# Patient Record
Sex: FEMALE | Race: WHITE | NOT HISPANIC OR LATINO | Employment: FULL TIME | URBAN - METROPOLITAN AREA
[De-identification: names, ages, dates, MRNs, and addresses within clinical notes are randomized per-mention and may not be internally consistent; named-entity substitution may affect disease eponyms.]

---

## 2023-09-11 ENCOUNTER — HOSPITAL ENCOUNTER (OUTPATIENT)
Dept: DATA CONVERSION | Facility: HOSPITAL | Age: 48
Discharge: HOME | End: 2023-09-11
Payer: COMMERCIAL

## 2023-09-11 DIAGNOSIS — R87.810 CERVICAL HIGH RISK HUMAN PAPILLOMAVIRUS (HPV) DNA TEST POSITIVE: ICD-10-CM

## 2023-09-21 ENCOUNTER — HOSPITAL ENCOUNTER (OUTPATIENT)
Dept: DATA CONVERSION | Facility: HOSPITAL | Age: 48
Discharge: HOME | End: 2023-09-21
Payer: COMMERCIAL

## 2023-09-21 DIAGNOSIS — Z12.31 ENCOUNTER FOR SCREENING MAMMOGRAM FOR MALIGNANT NEOPLASM OF BREAST: ICD-10-CM

## 2023-12-13 ENCOUNTER — TELEPHONE (OUTPATIENT)
Dept: OTOLARYNGOLOGY | Facility: CLINIC | Age: 48
End: 2023-12-13
Payer: COMMERCIAL

## 2023-12-13 DIAGNOSIS — B99.9 INFECTION: Primary | ICD-10-CM

## 2023-12-13 RX ORDER — AMOXICILLIN AND CLAVULANATE POTASSIUM 875; 125 MG/1; MG/1
1 TABLET, FILM COATED ORAL 2 TIMES DAILY
Qty: 20 TABLET | Refills: 0 | Status: SHIPPED | OUTPATIENT
Start: 2023-12-13 | End: 2023-12-23

## 2023-12-13 NOTE — TELEPHONE ENCOUNTER
For 1 week pt has had a cough, pnd, color drainage, facial and teeth pain.  Has been using allergy medications. Negative covid test.   NKDA

## 2024-01-08 ENCOUNTER — TELEPHONE (OUTPATIENT)
Dept: OBSTETRICS AND GYNECOLOGY | Facility: CLINIC | Age: 49
End: 2024-01-08
Payer: COMMERCIAL

## 2024-01-08 DIAGNOSIS — N88.2 CERVICAL STENOSIS (UTERINE CERVIX): Primary | ICD-10-CM

## 2024-01-08 NOTE — TELEPHONE ENCOUNTER
CS pt due for repeat pap in March. Appt made. CS wanted pt to complete Cytotec prior to visit. Please send now so pt has on hand for visit.

## 2024-01-10 RX ORDER — MISOPROSTOL 200 UG/1
200 TABLET ORAL ONCE
Qty: 1 TABLET | Refills: 0 | Status: SHIPPED | OUTPATIENT
Start: 2024-01-10 | End: 2024-01-10

## 2024-02-02 DIAGNOSIS — J01.90 ACUTE SINUSITIS, RECURRENCE NOT SPECIFIED, UNSPECIFIED LOCATION: ICD-10-CM

## 2024-02-02 RX ORDER — ALBUTEROL SULFATE 90 UG/1
AEROSOL, METERED RESPIRATORY (INHALATION)
Qty: 18 G | Refills: 2 | Status: SHIPPED | OUTPATIENT
Start: 2024-02-02 | End: 2024-04-27 | Stop reason: WASHOUT

## 2024-02-23 ENCOUNTER — APPOINTMENT (OUTPATIENT)
Dept: OTOLARYNGOLOGY | Facility: CLINIC | Age: 49
End: 2024-02-23
Payer: COMMERCIAL

## 2024-03-21 ENCOUNTER — OFFICE VISIT (OUTPATIENT)
Dept: OBSTETRICS AND GYNECOLOGY | Facility: CLINIC | Age: 49
End: 2024-03-21
Payer: COMMERCIAL

## 2024-03-21 VITALS — WEIGHT: 203.8 LBS | DIASTOLIC BLOOD PRESSURE: 92 MMHG | SYSTOLIC BLOOD PRESSURE: 130 MMHG | BODY MASS INDEX: 34.71 KG/M2

## 2024-03-21 DIAGNOSIS — R87.810 CERVICAL HIGH RISK HPV (HUMAN PAPILLOMAVIRUS) TEST POSITIVE: Primary | ICD-10-CM

## 2024-03-21 PROCEDURE — 87624 HPV HI-RISK TYP POOLED RSLT: CPT | Performed by: OBSTETRICS & GYNECOLOGY

## 2024-03-21 PROCEDURE — 1036F TOBACCO NON-USER: CPT | Performed by: OBSTETRICS & GYNECOLOGY

## 2024-03-21 PROCEDURE — 88175 CYTOPATH C/V AUTO FLUID REDO: CPT | Mod: TC,GCY | Performed by: OBSTETRICS & GYNECOLOGY

## 2024-03-21 PROCEDURE — 99213 OFFICE O/P EST LOW 20 MIN: CPT | Performed by: OBSTETRICS & GYNECOLOGY

## 2024-03-21 RX ORDER — LEVONORGESTREL 52 MG/1
1 INTRAUTERINE DEVICE INTRAUTERINE ONCE
COMMUNITY

## 2024-03-21 RX ORDER — VALSARTAN AND HYDROCHLOROTHIAZIDE 80; 12.5 MG/1; MG/1
1 TABLET, FILM COATED ORAL DAILY
COMMUNITY
End: 2024-05-07

## 2024-03-21 ASSESSMENT — ENCOUNTER SYMPTOMS
DYSURIA: 0
COLOR CHANGE: 0
HEADACHES: 0
FEVER: 0
NAUSEA: 0
COUGH: 0
VOMITING: 0
ABDOMINAL PAIN: 0
CHILLS: 0
UNEXPECTED WEIGHT CHANGE: 0
OCCASIONAL FEELINGS OF UNSTEADINESS: 0
SHORTNESS OF BREATH: 0
FATIGUE: 0
DEPRESSION: 0
LOSS OF SENSATION IN FEET: 0
DIZZINESS: 0

## 2024-03-21 ASSESSMENT — PATIENT HEALTH QUESTIONNAIRE - PHQ9
1. LITTLE INTEREST OR PLEASURE IN DOING THINGS: NOT AT ALL
SUM OF ALL RESPONSES TO PHQ9 QUESTIONS 1 & 2: 0
2. FEELING DOWN, DEPRESSED OR HOPELESS: NOT AT ALL

## 2024-03-21 ASSESSMENT — LIFESTYLE VARIABLES
HOW OFTEN DO YOU HAVE SIX OR MORE DRINKS ON ONE OCCASION: LESS THAN MONTHLY
SKIP TO QUESTIONS 9-10: 0
HOW OFTEN DO YOU HAVE A DRINK CONTAINING ALCOHOL: MONTHLY OR LESS
AUDIT-C TOTAL SCORE: 2
HOW MANY STANDARD DRINKS CONTAINING ALCOHOL DO YOU HAVE ON A TYPICAL DAY: 1 OR 2

## 2024-03-21 ASSESSMENT — PAIN SCALES - GENERAL: PAINLEVEL: 0-NO PAIN

## 2024-03-21 NOTE — PROGRESS NOTES
Subjective   Patient ID: Sruthi Murray is a 49 y.o. female who presents for Repeat PAP.  49-year-old female presents for follow-up Pap smear.  Patient getting Paps every 6 months after cone biopsy done in 2022, showing MISSAEL 1.  Patient's Pap have ranged between ASCUS to negative but still HPV 16 and other positive over the last couple of times.  Patient states she does not want to get any colposcopy done anymore.  Patient is in her words over all of this stuff.  Patient wanting to discuss further options and just be done with it i.e. get hysterectomy if she still has issues.        Review of Systems   Constitutional:  Negative for chills, fatigue, fever and unexpected weight change.   Respiratory:  Negative for cough and shortness of breath.    Gastrointestinal:  Negative for abdominal pain, nausea and vomiting.   Genitourinary:  Negative for dyspareunia, dysuria, pelvic pain and vaginal discharge.   Skin:  Negative for color change and rash.   Neurological:  Negative for dizziness and headaches.       Objective   Physical Exam  Constitutional:       Appearance: Normal appearance.   HENT:      Head: Normocephalic and atraumatic.   Genitourinary:     General: Normal vulva.      Vagina: Normal.      Cervix: Normal.   Skin:     General: Skin is warm and dry.   Neurological:      General: No focal deficit present.      Mental Status: She is alert.   Psychiatric:         Attention and Perception: Attention and perception normal.         Mood and Affect: Mood and affect normal.         Speech: Speech normal.         Behavior: Behavior is cooperative.         Assessment/Plan   Problem List Items Addressed This Visit    None  Visit Diagnoses         Codes    Cervical high risk HPV (human papillomavirus) test positive    -  Primary R87.810    Relevant Orders    THINPREP PAP TEST        Will make televisit to discuss options once results revealed.         Ming Tamayo MD 03/21/24 5:08 PM

## 2024-04-01 LAB
CYTOLOGY CMNT CVX/VAG CYTO-IMP: NORMAL
HPV HR 12 DNA GENITAL QL NAA+PROBE: POSITIVE
HPV HR GENOTYPES PNL CVX NAA+PROBE: POSITIVE
HPV16 DNA SPEC QL NAA+PROBE: POSITIVE
HPV18 DNA SPEC QL NAA+PROBE: NEGATIVE
LAB AP HPV GENOTYPE QUESTION: YES
LAB AP HPV HR: NORMAL
LABORATORY COMMENT REPORT: NORMAL
LMP START DATE: NORMAL
PATH REPORT.TOTAL CANCER: NORMAL

## 2024-04-12 ENCOUNTER — OFFICE VISIT (OUTPATIENT)
Dept: OTOLARYNGOLOGY | Facility: CLINIC | Age: 49
End: 2024-04-12
Payer: COMMERCIAL

## 2024-04-12 VITALS — WEIGHT: 200 LBS | HEIGHT: 65 IN | BODY MASS INDEX: 33.32 KG/M2

## 2024-04-12 DIAGNOSIS — J01.91 ACUTE RECURRENT SINUSITIS, UNSPECIFIED LOCATION: Primary | ICD-10-CM

## 2024-04-12 DIAGNOSIS — J34.2 DEVIATED NASAL SEPTUM: ICD-10-CM

## 2024-04-12 DIAGNOSIS — J30.9 ALLERGIC RHINITIS, UNSPECIFIED SEASONALITY, UNSPECIFIED TRIGGER: ICD-10-CM

## 2024-04-12 PROCEDURE — 99213 OFFICE O/P EST LOW 20 MIN: CPT | Performed by: OTOLARYNGOLOGY

## 2024-04-12 PROCEDURE — 1036F TOBACCO NON-USER: CPT | Performed by: OTOLARYNGOLOGY

## 2024-04-12 NOTE — PROGRESS NOTES
"Chief Complaint   Patient presents with    Follow-up     EP 2/23/23- ANNUAL SINUS CK      Date of Evaluation: 4/12/2024   HPI  She is having 2-3 sinus infections a year requiring antibiotics.  She usually calls me for Augmentin.  Other than that she is doing very well and breathes okay through the nose.  Sruthi Murray is a 49 y.o. female with allergic rhinitis and recurrent sinusitis.  She has done immunotherapy in the past.  She does have a wood-burning stove at home.  She is on Flonase.       Past Medical History:   Diagnosis Date    Abnormal mammogram     Allergies     HTN (hypertension)     Personal history of other diseases of the circulatory system     History of hypertension    Personal history of other diseases of the respiratory system     History of asthma      Past Surgical History:   Procedure Laterality Date    BIOPSY  03/04/2022    TUBAL LIGATION  2003          Medications:   Current Outpatient Medications   Medication Instructions    albuterol 90 mcg/actuation inhaler INHALE 2 PUFF(S) AS DIRECTED 15 MINUTES BEFORE EXERCISE    levonorgestrel (Mirena) 21 mcg/24 hours (8 yrs) 52 mg IUD 1 each, intrauterine, Once    miSOPROStoL (CYTOTEC) 200 mcg, vaginal, Once, Please complete 24hr prior to procedure/pap smear    valsartan-hydrochlorothiazide (Diovan-HCT) 80-12.5 mg tablet 1 tablet, oral, Daily        Allergies:  No Known Allergies     Physical Exam:  Last Recorded Vitals  Height 1.651 m (5' 5\"), weight 90.7 kg (200 lb).  []General appearance: Well-developed, well-nourished in no acute distress, conversant with normal voice quality    Head/face: No erythema or edema or facial tenderness, and normal facial nerve function bilaterally    External ear: Clear external auditory canals with normal pinnae  Tube status: N/A  Middle ear: Tympanic membranes intact and mobile, middle ears normal.  Tympanic membrane perforation: N/A  Mastoid bowl: N/A  Hearing: Normal conversational awareness at normal speech " thresholds    Nose visualized using: Anterior rhinoscopy  Nasal dorsum: Nontraumatic midline appearance  Septum: Deviated  Inferior turbinates: Normal, pink  Secretions: Dry    Oral cavity and oropharynx: Normal  Teeth: Good condition  Floor of mouth: without lesions  Palate: Normal hard palate, soft palate and uvula  Oropharynx: Clear, no lesions present  Buccal mucosa: Normal without masses or lesions  Lips: Normal    Nasopharynx: Inadequate mirror exam secondary to gag/anatomy    Neck:  Salivary glands: Normal bilateral parotid and submandibular glands by inspection and palpation.  Non-thyroid masses: No palpable masses or significant lymphadenopathy  Trachea: Midline  Thyroid: No thyromegaly or palpable nodules  Temporomandibular joint: Nontender  Cervical range of motion: Normal    Neurologic exam: Alert and oriented x3, appropriate affect.  Cranial nerves II-XII normal bilaterally  Extraocular movement: Extraocular movement intact, normal gaze alignment        Sruthi was seen today for follow-up.  Diagnoses and all orders for this visit:  Acute recurrent sinusitis, unspecified location (Primary)  Allergic rhinitis, unspecified seasonality, unspecified trigger  Deviated nasal septum       PLAN  Continue Flonase.  Recheck in 1 year.  Call me for Augmentin when necessary.  She is not bothered by the deviated nasal septum and no intervention at this time    Ricardo Hyatt MD

## 2024-04-16 DIAGNOSIS — J30.9 ALLERGIC RHINITIS, UNSPECIFIED: ICD-10-CM

## 2024-04-22 ENCOUNTER — TELEMEDICINE (OUTPATIENT)
Dept: OBSTETRICS AND GYNECOLOGY | Facility: CLINIC | Age: 49
End: 2024-04-22
Payer: COMMERCIAL

## 2024-04-22 DIAGNOSIS — N91.2 ABSENCE OF MENSES DUE TO USE OF CONTRACEPTIVE: ICD-10-CM

## 2024-04-22 DIAGNOSIS — R63.5 WEIGHT GAIN: ICD-10-CM

## 2024-04-22 DIAGNOSIS — R23.2 HOT FLASHES: ICD-10-CM

## 2024-04-22 DIAGNOSIS — R87.810 CERVICAL HIGH RISK HPV (HUMAN PAPILLOMAVIRUS) TEST POSITIVE: Primary | ICD-10-CM

## 2024-04-22 PROCEDURE — 99213 OFFICE O/P EST LOW 20 MIN: CPT | Performed by: OBSTETRICS & GYNECOLOGY

## 2024-04-22 PROCEDURE — 1036F TOBACCO NON-USER: CPT | Performed by: OBSTETRICS & GYNECOLOGY

## 2024-04-22 RX ORDER — FLUTICASONE PROPIONATE 50 MCG
SPRAY, SUSPENSION (ML) NASAL
Qty: 48 ML | Refills: 3 | Status: SHIPPED | OUTPATIENT
Start: 2024-04-22

## 2024-04-26 ENCOUNTER — LAB (OUTPATIENT)
Dept: LAB | Facility: LAB | Age: 49
End: 2024-04-26
Payer: COMMERCIAL

## 2024-04-26 DIAGNOSIS — R63.5 WEIGHT GAIN: ICD-10-CM

## 2024-04-26 DIAGNOSIS — N91.2 ABSENCE OF MENSES DUE TO USE OF CONTRACEPTIVE: ICD-10-CM

## 2024-04-26 DIAGNOSIS — R23.2 HOT FLASHES: ICD-10-CM

## 2024-04-26 LAB
ERYTHROCYTE [DISTWIDTH] IN BLOOD BY AUTOMATED COUNT: 13.2 % (ref 11.5–14.5)
ESTRADIOL SERPL-MCNC: 24 PG/ML
FSH SERPL-ACNC: 140.4 IU/L
HCT VFR BLD AUTO: 44.4 % (ref 36–46)
HGB BLD-MCNC: 14.2 G/DL (ref 12–16)
LH SERPL-ACNC: 60.1 IU/L
MCH RBC QN AUTO: 29.1 PG (ref 26–34)
MCHC RBC AUTO-ENTMCNC: 32 G/DL (ref 32–36)
MCV RBC AUTO: 91 FL (ref 80–100)
NRBC BLD-RTO: 0 /100 WBCS (ref 0–0)
PLATELET # BLD AUTO: 276 X10*3/UL (ref 150–450)
PROGEST SERPL-MCNC: 0.4 NG/ML
RBC # BLD AUTO: 4.88 X10*6/UL (ref 4–5.2)
TSH SERPL-ACNC: 2.14 MIU/L (ref 0.44–3.98)
WBC # BLD AUTO: 4.2 X10*3/UL (ref 4.4–11.3)

## 2024-04-26 PROCEDURE — 36415 COLL VENOUS BLD VENIPUNCTURE: CPT

## 2024-04-26 PROCEDURE — 85027 COMPLETE CBC AUTOMATED: CPT

## 2024-04-26 PROCEDURE — 84443 ASSAY THYROID STIM HORMONE: CPT

## 2024-04-26 PROCEDURE — 82670 ASSAY OF TOTAL ESTRADIOL: CPT

## 2024-04-26 PROCEDURE — 83001 ASSAY OF GONADOTROPIN (FSH): CPT

## 2024-04-26 PROCEDURE — 83002 ASSAY OF GONADOTROPIN (LH): CPT

## 2024-04-26 PROCEDURE — 84144 ASSAY OF PROGESTERONE: CPT

## 2024-04-27 ASSESSMENT — ENCOUNTER SYMPTOMS: ABDOMINAL PAIN: 0

## 2024-04-28 NOTE — PROGRESS NOTES
Subjective   Patient ID: Sruthi Murray is a 49 y.o. female who presents for No chief complaint on file..  49-year-old female presents to discuss Pap smear.  Patient with history of cone and longstanding history of abnormal Paps.  Patient had previously mentioned she was done with getting evaluated and was considering getting everything out.  Patient ultimately did change her mind and is willing to move forward with continued conservative management.  Patient also complaining of some intermittent hot flashes and weight gain.        Review of Systems   Gastrointestinal:  Negative for abdominal pain.   Genitourinary:  Negative for dyspareunia, menstrual problem and vaginal bleeding.       Objective   Physical Exam na    Assessment/Plan   Problem List Items Addressed This Visit    None  Visit Diagnoses         Codes    Cervical high risk HPV (human papillomavirus) test positive    -  Primary R87.810    Hot flashes     R23.2    Relevant Orders    Follicle Stimulating Hormone (Completed)    Luteinizing Hormone (Completed)    Estradiol (Completed)    Progesterone (Completed)    CBC (Completed)    TSH with reflex to Free T4 if abnormal (Completed)    Absence of menses due to use of contraceptive     N91.2    Relevant Orders    Follicle Stimulating Hormone (Completed)    Luteinizing Hormone (Completed)    Estradiol (Completed)    Progesterone (Completed)    CBC (Completed)    Weight gain     R63.5    Relevant Orders    TSH with reflex to Free T4 if abnormal (Completed)        For colposcopy         Ming Tamayo MD 04/27/24 11:19 PM

## 2024-05-05 DIAGNOSIS — I10 PRIMARY HYPERTENSION: ICD-10-CM

## 2024-05-06 ENCOUNTER — PROCEDURE VISIT (OUTPATIENT)
Dept: OBSTETRICS AND GYNECOLOGY | Facility: CLINIC | Age: 49
End: 2024-05-06
Payer: COMMERCIAL

## 2024-05-06 VITALS
SYSTOLIC BLOOD PRESSURE: 132 MMHG | WEIGHT: 200 LBS | BODY MASS INDEX: 31.39 KG/M2 | HEIGHT: 67 IN | DIASTOLIC BLOOD PRESSURE: 96 MMHG

## 2024-05-06 DIAGNOSIS — R87.810 CERVICAL HIGH RISK HPV (HUMAN PAPILLOMAVIRUS) TEST POSITIVE: Primary | ICD-10-CM

## 2024-05-06 PROBLEM — I10 PRIMARY HYPERTENSION: Status: ACTIVE | Noted: 2018-07-16

## 2024-05-06 PROCEDURE — 57456 ENDOCERV CURETTAGE W/SCOPE: CPT | Performed by: OBSTETRICS & GYNECOLOGY

## 2024-05-06 PROCEDURE — 88305 TISSUE EXAM BY PATHOLOGIST: CPT | Mod: TC,WESLAB | Performed by: OBSTETRICS & GYNECOLOGY

## 2024-05-06 PROCEDURE — 88305 TISSUE EXAM BY PATHOLOGIST: CPT | Performed by: PATHOLOGY

## 2024-05-06 PROCEDURE — 88342 IMHCHEM/IMCYTCHM 1ST ANTB: CPT | Performed by: PATHOLOGY

## 2024-05-06 ASSESSMENT — ENCOUNTER SYMPTOMS
OCCASIONAL FEELINGS OF UNSTEADINESS: 0
LOSS OF SENSATION IN FEET: 0
DEPRESSION: 0

## 2024-05-06 ASSESSMENT — PATIENT HEALTH QUESTIONNAIRE - PHQ9
2. FEELING DOWN, DEPRESSED OR HOPELESS: NOT AT ALL
1. LITTLE INTEREST OR PLEASURE IN DOING THINGS: NOT AT ALL
SUM OF ALL RESPONSES TO PHQ9 QUESTIONS 1 & 2: 0

## 2024-05-06 ASSESSMENT — LIFESTYLE VARIABLES
SKIP TO QUESTIONS 9-10: 1
AUDIT-C TOTAL SCORE: 1
HOW MANY STANDARD DRINKS CONTAINING ALCOHOL DO YOU HAVE ON A TYPICAL DAY: 1 OR 2
HOW OFTEN DO YOU HAVE SIX OR MORE DRINKS ON ONE OCCASION: NEVER
HOW OFTEN DO YOU HAVE A DRINK CONTAINING ALCOHOL: MONTHLY OR LESS

## 2024-05-06 ASSESSMENT — PAIN SCALES - GENERAL: PAINLEVEL: 0-NO PAIN

## 2024-05-07 ENCOUNTER — TELEPHONE (OUTPATIENT)
Dept: PRIMARY CARE | Facility: CLINIC | Age: 49
End: 2024-05-07
Payer: COMMERCIAL

## 2024-05-07 DIAGNOSIS — I10 PRIMARY HYPERTENSION: Primary | ICD-10-CM

## 2024-05-07 RX ORDER — VALSARTAN AND HYDROCHLOROTHIAZIDE 80; 12.5 MG/1; MG/1
1 TABLET, FILM COATED ORAL DAILY
Qty: 30 TABLET | Refills: 0 | Status: SHIPPED | OUTPATIENT
Start: 2024-05-07

## 2024-05-09 NOTE — PROGRESS NOTES
Colposcopy    Date/Time: 5/8/2024 10:10 PM    Performed by: Ming Tamayo MD  Authorized by: Ming Tamayo MD    Procedure location: cervix    Consent:     Patient questions answered: yes      Risks and benefits of the procedure and its alternatives discussed: yes      Procedural risks discussed:  Bleeding and infection    Consent obtained:  Written    Consent given by:  Patient  Indication:     Cervical indication(s): normal Pap smear, HPV 16 and high-risk HPV positive    Pre-procedure:     Speculum was placed in the vagina: yes      Prep solution(s): acetic acid    Procedure:     Colposcopy with: colposcopy only and endocervical curettage      Biopsy taken: no      Colposcopy details:  Pinpoint acetowhite changes noted; cervical scarring noted, stenotic    Cervix visibility: fully visualized      SCJ visibility: not fully visualized      Acetowhite lesion(s): cervix      Cervical impression: low grade    Post-procedure:     Patient tolerance of procedure:  Patient tolerated the procedure well with no immediate complications

## 2024-05-16 LAB
LAB AP ASR DISCLAIMER: NORMAL
LABORATORY COMMENT REPORT: NORMAL
PATH REPORT.FINAL DX SPEC: NORMAL
PATH REPORT.GROSS SPEC: NORMAL
PATH REPORT.RELEVANT HX SPEC: NORMAL
PATH REPORT.TOTAL CANCER: NORMAL

## 2024-05-21 ENCOUNTER — TELEPHONE (OUTPATIENT)
Dept: OBSTETRICS AND GYNECOLOGY | Facility: CLINIC | Age: 49
End: 2024-05-21
Payer: COMMERCIAL

## 2024-06-04 ENCOUNTER — LAB (OUTPATIENT)
Dept: LAB | Facility: LAB | Age: 49
End: 2024-06-04
Payer: COMMERCIAL

## 2024-06-04 DIAGNOSIS — I10 PRIMARY HYPERTENSION: ICD-10-CM

## 2024-06-04 LAB
ALBUMIN SERPL BCP-MCNC: 4.3 G/DL (ref 3.4–5)
ALP SERPL-CCNC: 64 U/L (ref 33–110)
ALT SERPL W P-5'-P-CCNC: 16 U/L (ref 7–45)
ANION GAP SERPL CALC-SCNC: 13 MMOL/L (ref 10–20)
APPEARANCE UR: CLEAR
AST SERPL W P-5'-P-CCNC: 17 U/L (ref 9–39)
BASOPHILS # BLD AUTO: 0.04 X10*3/UL (ref 0–0.1)
BASOPHILS NFR BLD AUTO: 0.8 %
BILIRUB SERPL-MCNC: 0.5 MG/DL (ref 0–1.2)
BILIRUB UR STRIP.AUTO-MCNC: NEGATIVE MG/DL
BUN SERPL-MCNC: 16 MG/DL (ref 6–23)
CALCIUM SERPL-MCNC: 9.2 MG/DL (ref 8.6–10.3)
CHLORIDE SERPL-SCNC: 101 MMOL/L (ref 98–107)
CHOLEST SERPL-MCNC: 153 MG/DL (ref 0–199)
CHOLESTEROL/HDL RATIO: 3.6
CO2 SERPL-SCNC: 28 MMOL/L (ref 21–32)
COLOR UR: NORMAL
CREAT SERPL-MCNC: 1.01 MG/DL (ref 0.5–1.05)
EGFRCR SERPLBLD CKD-EPI 2021: 68 ML/MIN/1.73M*2
EOSINOPHIL # BLD AUTO: 0.14 X10*3/UL (ref 0–0.7)
EOSINOPHIL NFR BLD AUTO: 2.8 %
ERYTHROCYTE [DISTWIDTH] IN BLOOD BY AUTOMATED COUNT: 13 % (ref 11.5–14.5)
GLUCOSE SERPL-MCNC: 83 MG/DL (ref 74–99)
GLUCOSE UR STRIP.AUTO-MCNC: NORMAL MG/DL
HCT VFR BLD AUTO: 42 % (ref 36–46)
HDLC SERPL-MCNC: 42.5 MG/DL
HGB BLD-MCNC: 13.7 G/DL (ref 12–16)
IMM GRANULOCYTES # BLD AUTO: 0.02 X10*3/UL (ref 0–0.7)
IMM GRANULOCYTES NFR BLD AUTO: 0.4 % (ref 0–0.9)
KETONES UR STRIP.AUTO-MCNC: NEGATIVE MG/DL
LDLC SERPL CALC-MCNC: 92 MG/DL
LEUKOCYTE ESTERASE UR QL STRIP.AUTO: NEGATIVE
LYMPHOCYTES # BLD AUTO: 1.49 X10*3/UL (ref 1.2–4.8)
LYMPHOCYTES NFR BLD AUTO: 29.7 %
MCH RBC QN AUTO: 29.2 PG (ref 26–34)
MCHC RBC AUTO-ENTMCNC: 32.6 G/DL (ref 32–36)
MCV RBC AUTO: 90 FL (ref 80–100)
MONOCYTES # BLD AUTO: 0.46 X10*3/UL (ref 0.1–1)
MONOCYTES NFR BLD AUTO: 9.2 %
NEUTROPHILS # BLD AUTO: 2.87 X10*3/UL (ref 1.2–7.7)
NEUTROPHILS NFR BLD AUTO: 57.1 %
NITRITE UR QL STRIP.AUTO: NEGATIVE
NON HDL CHOLESTEROL: 111 MG/DL (ref 0–149)
NRBC BLD-RTO: 0 /100 WBCS (ref 0–0)
PH UR STRIP.AUTO: 7 [PH]
PLATELET # BLD AUTO: 271 X10*3/UL (ref 150–450)
POTASSIUM SERPL-SCNC: 4 MMOL/L (ref 3.5–5.3)
PROT SERPL-MCNC: 6.7 G/DL (ref 6.4–8.2)
PROT UR STRIP.AUTO-MCNC: NEGATIVE MG/DL
RBC # BLD AUTO: 4.69 X10*6/UL (ref 4–5.2)
RBC # UR STRIP.AUTO: NEGATIVE /UL
SODIUM SERPL-SCNC: 138 MMOL/L (ref 136–145)
SP GR UR STRIP.AUTO: 1.01
TRIGL SERPL-MCNC: 92 MG/DL (ref 0–149)
UROBILINOGEN UR STRIP.AUTO-MCNC: NORMAL MG/DL
VLDL: 18 MG/DL (ref 0–40)
WBC # BLD AUTO: 5 X10*3/UL (ref 4.4–11.3)

## 2024-06-04 PROCEDURE — 36415 COLL VENOUS BLD VENIPUNCTURE: CPT

## 2024-06-04 PROCEDURE — 80053 COMPREHEN METABOLIC PANEL: CPT

## 2024-06-04 PROCEDURE — 80061 LIPID PANEL: CPT

## 2024-06-04 PROCEDURE — 81003 URINALYSIS AUTO W/O SCOPE: CPT

## 2024-06-04 PROCEDURE — 85025 COMPLETE CBC W/AUTO DIFF WBC: CPT

## 2024-06-11 PROBLEM — E66.9 OBESITY: Status: ACTIVE | Noted: 2024-06-11

## 2024-06-11 PROBLEM — R06.09 DYSPNEA ON EXERTION: Status: ACTIVE | Noted: 2023-05-11

## 2024-06-11 PROBLEM — D06.9 CARCINOMA IN SITU OF CERVIX: Status: ACTIVE | Noted: 2023-03-08

## 2024-06-11 PROBLEM — R92.8 ABNORMAL MAMMOGRAM: Status: ACTIVE | Noted: 2024-06-11

## 2024-06-11 PROBLEM — J01.90 ACUTE SINUSITIS: Status: RESOLVED | Noted: 2024-02-02 | Resolved: 2024-06-11

## 2024-06-11 NOTE — PROGRESS NOTES
"Subjective   Patient ID: Sruthi Murray is a 49 y.o. female who presents for Annual Exam.    HPI   # 1. SRUTHI is seen for for her comprehensive physical exam. PMH, PSH, family history and social history were reviewed and updated.  She feels well. She is exercising regularly and eating healthy. She works with a .   She works 40 to 55 hours a week.  Pre draw reviewed. All looks healthy.  She is current on tetanus vaccine. Declined again to schedule colonoscopy last year.    She is having some vague pain in her left knee only when she kneels for the past few months.  She took a tumble down the stairway in the winter when her dog tripped her up.  It is not affecting any of her activities.    Consultants: 1. Dr. Hyatt for allergies. She uses Flonase for her allergies with good control.   GYN history - Sees a Gynecologist. She has had a tubal and also has an IUD to control her periods.     # 2. SRUTHI is here for follow-up of hypertension. She takes Diovan 80/12.5 We have tried to stop the med in the past more than once and BP goes up. She saw Dr. Meade 2017 for a consult after her mother had an MI. She avoids salt. She had a coronary calcium score of 0 last year.    Review of Systems  Constitutional Symptoms: negative for fever, loss of appetite, headaches, fatigue or dizziness. Denies recent illness  Cardiovascular: negative for chest pain, palpitations, edema, claudication.   Respiratory: negative for shortness of breath, pain with breathing, coughing.   Gastrointestinal: negative for indigestion, abdominal pain, change in bowel habits, blood in stool.   Musculoskeletal: negative for joint pain, joint swelling, myalgias, cramps.   Integumentary: negative for change in mole, skin trouble or rash.   Neurological: negative for numbness, tingling, weakness, tremors.   Psychiatric: negative for depression, anxiety.   Allergic: negative for eczema  Objective   /82   Pulse 84   Ht 1.676 m (5' 6\")   " Wt 89.8 kg (198 lb)   SpO2 98%   BMI 31.96 kg/m²     Physical Exam  General Appearance: MC is in no acute distress.   Eyes: PERRLA, EOMI, no scleral icterus. Vision appears normal in both eyes.  Ears, Nose, Mouth, Throat:   EARS: External bilateral ears reveal normal helix, tragus and ear lobe. Both canals are normal. Tympanic membranes are clear and intact. Hearing is grossly normal    nose is noncongested.  Throat is noninjected and without exudate.  Neck: Inspection of the neck reveals no enlarged nodes, no masses, no JVD, euthyroid. Palpation shows normal pulsation, no adenopathy, or mass .  Chest: Lungs are clear to auscultation bilaterally.  Cardiovascular: Heart is RRR, S1, S2. No murmurs or gallops. Extremities: No edema  Abdomen: soft, nontender, no organomegaly or masses.  Lymph Nodes: Palpation of the inguinal area are within normal limit.  Musculoskeletal:   Intact.  No joint swelling Extremities: Full Range of motion and strength throughout. Both feet have no evidence of lesions and nails are in good repair.    Left knee shows no joint line tenderness.  Negative anterior posterior drawer sign.  No instability or point tenderness.  Skin: Skin has no bruising, rash, eruptions, or abnormal appearing lesions.  Neurological: Intact and non-focal. Cranial nerves II - XII are grossly intact. Motor exams reveals normal tone and strength ,  Psychiatric: Patient's mood is normal with good eye contact. Affect is appropriate.    Assessment/Plan   Diagnoses and all orders for this visit:  Well adult exam  Primary hypertension  Obesity without serious comorbidity, unspecified classification, unspecified obesity type  Encounter for screening for malignant neoplasm of colon      Her medication is refilled for 1 year.  Diet and activity was discussed.  She was again referred for colonoscopy.  No further evaluation needed for her knee discomfort as she has no disability.  She should follow-up in 1 year or as  needed sooner.

## 2024-06-13 ENCOUNTER — APPOINTMENT (OUTPATIENT)
Dept: PRIMARY CARE | Facility: CLINIC | Age: 49
End: 2024-06-13
Payer: COMMERCIAL

## 2024-06-13 VITALS
SYSTOLIC BLOOD PRESSURE: 126 MMHG | DIASTOLIC BLOOD PRESSURE: 82 MMHG | HEART RATE: 84 BPM | BODY MASS INDEX: 31.82 KG/M2 | HEIGHT: 66 IN | WEIGHT: 198 LBS | OXYGEN SATURATION: 98 %

## 2024-06-13 DIAGNOSIS — Z12.11 ENCOUNTER FOR SCREENING FOR MALIGNANT NEOPLASM OF COLON: ICD-10-CM

## 2024-06-13 DIAGNOSIS — Z00.00 WELL ADULT EXAM: Primary | ICD-10-CM

## 2024-06-13 DIAGNOSIS — I10 PRIMARY HYPERTENSION: ICD-10-CM

## 2024-06-13 DIAGNOSIS — E66.9 OBESITY WITHOUT SERIOUS COMORBIDITY, UNSPECIFIED CLASSIFICATION, UNSPECIFIED OBESITY TYPE: ICD-10-CM

## 2024-06-13 PROBLEM — R06.09 DYSPNEA ON EXERTION: Status: RESOLVED | Noted: 2023-05-11 | Resolved: 2024-06-13

## 2024-06-13 PROBLEM — R92.8 ABNORMAL MAMMOGRAM: Status: RESOLVED | Noted: 2024-06-11 | Resolved: 2024-06-13

## 2024-06-13 PROCEDURE — 1036F TOBACCO NON-USER: CPT | Performed by: FAMILY MEDICINE

## 2024-06-13 PROCEDURE — 99396 PREV VISIT EST AGE 40-64: CPT | Performed by: FAMILY MEDICINE

## 2024-06-13 PROCEDURE — 3079F DIAST BP 80-89 MM HG: CPT | Performed by: FAMILY MEDICINE

## 2024-06-13 PROCEDURE — 93000 ELECTROCARDIOGRAM COMPLETE: CPT | Performed by: FAMILY MEDICINE

## 2024-06-13 PROCEDURE — 3074F SYST BP LT 130 MM HG: CPT | Performed by: FAMILY MEDICINE

## 2024-06-13 RX ORDER — VALSARTAN AND HYDROCHLOROTHIAZIDE 80; 12.5 MG/1; MG/1
1 TABLET, FILM COATED ORAL DAILY
Qty: 90 TABLET | Refills: 3 | Status: SHIPPED | OUTPATIENT
Start: 2024-06-13 | End: 2025-06-13

## 2024-06-13 ASSESSMENT — PAIN SCALES - GENERAL: PAINLEVEL: 0-NO PAIN

## 2024-06-13 ASSESSMENT — PATIENT HEALTH QUESTIONNAIRE - PHQ9
1. LITTLE INTEREST OR PLEASURE IN DOING THINGS: NOT AT ALL
2. FEELING DOWN, DEPRESSED OR HOPELESS: NOT AT ALL
SUM OF ALL RESPONSES TO PHQ9 QUESTIONS 1 AND 2: 0

## 2024-06-17 ENCOUNTER — TELEPHONE (OUTPATIENT)
Dept: OTOLARYNGOLOGY | Facility: CLINIC | Age: 49
End: 2024-06-17
Payer: COMMERCIAL

## 2024-06-17 DIAGNOSIS — B99.9 INFECTION: Primary | ICD-10-CM

## 2024-06-17 RX ORDER — AMOXICILLIN AND CLAVULANATE POTASSIUM 875; 125 MG/1; MG/1
1 TABLET, FILM COATED ORAL 2 TIMES DAILY
Qty: 20 TABLET | Refills: 0 | Status: SHIPPED | OUTPATIENT
Start: 2024-06-17 | End: 2024-06-27

## 2024-06-17 NOTE — TELEPHONE ENCOUNTER
LOV 4/12/2024 for recurrent sinusitis and allergic rhinitis. Uses flonase regularly. Called with 2 weeks increased congestion, runny nose, cough, PND, prod for thick yellow-green. Cough and PND causing nausea. She has been taking decongestant, but is requesting abx. She usuallly is rx'd augmentin. NKDA.

## 2024-07-22 ENCOUNTER — APPOINTMENT (OUTPATIENT)
Dept: DERMATOLOGY | Facility: CLINIC | Age: 49
End: 2024-07-22
Payer: COMMERCIAL

## 2024-07-22 VITALS — SYSTOLIC BLOOD PRESSURE: 145 MMHG | DIASTOLIC BLOOD PRESSURE: 103 MMHG | HEART RATE: 109 BPM

## 2024-07-22 DIAGNOSIS — C44.729 SQUAMOUS CELL CARCINOMA OF SKIN OF LEFT LOWER LIMB, INCLUDING HIP: ICD-10-CM

## 2024-07-22 PROCEDURE — 99214 OFFICE O/P EST MOD 30 MIN: CPT | Performed by: DERMATOLOGY

## 2024-07-22 PROCEDURE — 17313 MOHS 1 STAGE T/A/L: CPT | Performed by: DERMATOLOGY

## 2024-07-22 NOTE — LETTER
July 24, 2024     Camryn Oliver MD  [address]  [address]  [place] [id number]    Patient: Sruthi uMrray   YOB: 1975   Date of Visit: 7/22/2024       Dear Dr. Camryn Oliver MD:    Thank you for referring Sruthi Murray to me for evaluation. Below are my notes for this consultation.  If you have questions, please do not hesitate to call me. I look forward to following your patient along with you.       Sincerely,     Jennie Peraza MD      CC: No Recipients  ______________________________________________________________________________________    Mohs Surgery Operative Note    Date of Surgery:  7/22/2024  Surgeon:  Jennie Peraza MD  Office Location: [address]  [address] [facility] [address]  [facility] [address]  Dept: [phone]  Dept Fax: [phone]  Referring Provider: Camryn Oliver M.D  [facility]  [address], [address]  [place] [address]  Phone: [phone]  Fax: [phone]       Assessment/Plan  Pre-procedure:   Obtained informed consent: written from patient  The surgical site was identified and confirmed with the patient.     Intra-operative:   Audible time out called at : 8:23 AM 07/22/24  by: Deisy Albarran RN   Verified patient name, birthdate, site, specimen bottle label & requisition.    The planned procedure(s) was again reviewed with the patient. The risks of bleeding, infection, nerve damage and scarring were reviewed. Written authorization was obtained. The patient identity, surgical site, and planned procedure(s) were verified. The provider acted as both surgeon and pathologist.     Squamous cell carcinoma of skin of left lower limb, including hip  Left Distal Pretibial Region  Mohs surgery  Consent obtained: written    Universal Protocol:  Procedure explained and questions answered to patient or proxy's satisfaction: Yes    Test results available and properly labeled: Yes    Pathology report  "reviewed: Yes    External notes reviewed: Yes    Photo or diagram used for site identification: Yes    Site/side marked: Yes    Slide independently reviewed by Mohs surgeon: Yes    Immediately prior to procedure a time out was called: Yes    Patient identity confirmed: verbally with patient  Preparation: Patient was prepped and draped in usual sterile fashion      Anticoagulation:  Is the patient taking prescription anticoagulant and/or aspirin prescribed/recommended by a physician? No    Was the anticoagulation regimen changed prior to Mohs? No      Anesthesia:  Anesthesia method: local infiltration  Local anesthetic: lidocaine 2% WITH epi    Procedure Details:  Biopsy accession number: [id number] \"outside path\"  Date of biopsy: 7/12/2024  Pre-Op diagnosis: squamous cell carcinoma  SCC subtype: in situ  Surgery side: left  Surgical site (from skin exam): Left Distal Pretibial Region  Pre-operative length (cm): 1.5  Pre-operative width (cm): 1.5  Indications for Mohs surgery: anatomic location where tissue conservation is critical and ill-defined borders  Previously treated? No      Micrographic Surgery Details:  Post-operative length (cm): 1.8  Post-operative width (cm): 1.7  Number of Mohs stages: 1    Stage 1     Comments: The patient was brought into the operating room and placed in the procedure chair in the appropriate position.  The area positive by previous biopsy was identified and confirmed with the patient. The area of clinically obvious tumor was debulked using a curette and/or scalpel as needed. An incision was made following the Mohs approach through the skin. The specimen was taken to the lab, divided into 2 piece(s) and appropriately chromacoded and processed.  Tumor features identified on Mohs section: no tumor identified  Depth of defect: subcutaneous fat    Patient tolerance of procedure: tolerated well, no immediate complications    Reconstruction:  Was the defect reconstructed?: No    Hemostasis " achieved with: electrodesiccation  Outcome: patient tolerated procedure well with no complications    Post-procedure details: sterile dressing applied and wound care instructions given    Dressing type: Gelfoam, Telfa pad, pressure dressing and Hypafix      Repair: After a discussion with the patient regarding the options for wound closure, a decision was made to proceed with second intention healing.  Dressing F/U: Surgifoam was placed in the wound. A pressure dressing was placed to help stabilize the wound and to minimize the risk of postoperative bleeding. Wound care was discussed, and the patient was given written post-operative wound care instructions.     The final repair measured 1.8 x 1.7 cm            Wound care was discussed, and the patient was given written post-operative wound care instructions.      The patient will follow up with Jennie Peraza MD as needed for any post operative problems or concerns, and will follow up with their primary dermatologist as scheduled.        Office Visit Note  Date: 7/22/2024  Surgeon:  Jennie Peraza MD  Office Location: [address] [address]  Dept: [phone]  Dept Fax: [phone]  Referring Provider: Camryn Oliver M.D  [facility]  [address] [place] [address], [address]  [place] [id number]  Phone: [phone]  Fax: [phone]     Subjective  Sruthi Murray is a 49 y.o. female who presents for the following: MOHS Surgery (Left Distal Pretibial Region- SCCIS)    According to the patient, the lesion has been present for approximately 1 month at the time of diagnosis.  The lesion is not causing symptoms.  The lesion has not been treated previously.    The patient does not have a pacemaker / defibrillator.  The patient does not have a heart valve / joint replacement.    The patient is not on blood thinners.  The patient does not have a history of hepatitis B or C.  The  patient does not have a history of HIV.  The patient does not have a history of immunosuppression (e.g. organ transplantation, malignancy, medications)    Review of Systems:  No other skin or systemic complaints other than what is documented elsewhere in the note.    MEDICAL HISTORY: clinically relevant history including significant past medical history, medications and allergies was reviewed and documented in Epic.    Objective  Well appearing patient in no apparent distress; mood and affect are within normal limits.  Vital signs: See record.  Noted on the Left Distal Pretibial Region  Is a 1.5 x 1.5 cm scar    The patient confirmed the identified site.    Discussion:  The nature of the diagnosis was explained. The lesion is a skin cancer.  It has a risk of local growth and distant spread. The condition is associated with sun exposure.  Warning signs of non-melanoma skin cancer discussed. Patient was instructed to perform monthly self skin examination.  We recommended that the patient have regular full skin exams given an increased risk of subsequent skin cancers. The patient was instructed to use sun protective behaviors including use of broad spectrum sunscreens and sun protective clothing to reduce risk of skin cancers.      Risks, benefits, side effects of Mohs surgery were discussed with patient and the patient voiced understanding.  It was explained that even though the cure rate of Mohs is very high it is not 100%. Risks of surgery including but not limited to bleeding, infection, numbness, nerve damage, and scar were reviewed.  Discussion included wound care requirements, activity restrictions, likely scar outcome and time to heal.     After Mohs surgery, the defect may need to be repaired surgically and the scar may be longer than the original lesion.  Reconstruction options, risks, and benefits were reviewed including second intention healing, linear repair (4-1 ratio was explained), local flaps, skin  grafts, cartilage grafts and interpolation flaps (the need for multiple surgeries was explained). Possible outcomes were reviewed including likely scar appearance, failure of flap survival, infection, bleeding and the need for revision surgery.     The pathology was reviewed, the photograph was reviewed, and the referring physician's note was reviewed.    Patient elected for Mohs surgery.

## 2024-07-22 NOTE — PROGRESS NOTES
Office Visit Note  Date: 7/22/2024  Surgeon:  Jennie Peraza MD  Office Location: DO [address] [facility]  [address]  [address]  [facility] [address]  Dept: [phone]  Dept Fax: [phone]  Referring Provider: [facility]  [address] [place] [address], [address]  [place] [id number]  Phone: [phone]  Fax: [phone]     Subjective   Sruthi Murray is a 49 y.o. female who presents for the following: MOHS Surgery (Left Distal Pretibial Region- SCCIS)    According to the patient, the lesion has been present for approximately 1 month at the time of diagnosis.  The lesion is not causing symptoms.  The lesion has not been treated previously.    The patient does not have a pacemaker / defibrillator.  The patient does not have a heart valve / joint replacement.    The patient is not on blood thinners.  The patient does not have a history of hepatitis B or C.  The patient does not have a history of HIV.  The patient does not have a history of immunosuppression (e.g. organ transplantation, malignancy, medications)    Review of Systems:  No other skin or systemic complaints other than what is documented elsewhere in the note.    MEDICAL HISTORY: clinically relevant history including significant past medical history, medications and allergies was reviewed and documented in Epic.    Objective   Well appearing patient in no apparent distress; mood and affect are within normal limits.  Vital signs: See record.  Noted on the Left Distal Pretibial Region  Is a 1.5 x 1.5 cm scar    The patient confirmed the identified site.    Discussion:  The nature of the diagnosis was explained. The lesion is a skin cancer.  It has a risk of local growth and distant spread. The condition is associated with sun exposure.  Warning signs of non-melanoma skin cancer discussed. Patient was instructed to perform monthly self skin examination.  We recommended that the patient have  regular full skin exams given an increased risk of subsequent skin cancers. The patient was instructed to use sun protective behaviors including use of broad spectrum sunscreens and sun protective clothing to reduce risk of skin cancers.      Risks, benefits, side effects of Mohs surgery were discussed with patient and the patient voiced understanding.  It was explained that even though the cure rate of Mohs is very high it is not 100%. Risks of surgery including but not limited to bleeding, infection, numbness, nerve damage, and scar were reviewed.  Discussion included wound care requirements, activity restrictions, likely scar outcome and time to heal.     After Mohs surgery, the defect may need to be repaired surgically and the scar may be longer than the original lesion.  Reconstruction options, risks, and benefits were reviewed including second intention healing, linear repair (4-1 ratio was explained), local flaps, skin grafts, cartilage grafts and interpolation flaps (the need for multiple surgeries was explained). Possible outcomes were reviewed including likely scar appearance, failure of flap survival, infection, bleeding and the need for revision surgery.     The pathology was reviewed, the photograph was reviewed, and the referring physician's note was reviewed.    Patient elected for Mohs surgery.

## 2024-07-22 NOTE — PROGRESS NOTES
Mohs Surgery Operative Note    Date of Surgery:  7/22/2024  Surgeon:  Jennie Peraza MD  Office Location:  [facility]  [address] [facility]  [address]  [facility] [address]  Dept: [phone]  Dept Fax: [phone]  Referring Provider: aCmryn Oliver M.D  [facility]  [address] [place] [address], [address]  [place] [id number]  Phone: [phone]  Fax: [phone]       Assessment/Plan   Pre-procedure:   Obtained informed consent: written from patient  The surgical site was identified and confirmed with the patient.     Intra-operative:   Audible time out called at : 8:23 AM 07/22/24  by: Deisy Albarran RN   Verified patient name, birthdate, site, specimen bottle label & requisition.    The planned procedure(s) was again reviewed with the patient. The risks of bleeding, infection, nerve damage and scarring were reviewed. Written authorization was obtained. The patient identity, surgical site, and planned procedure(s) were verified. The provider acted as both surgeon and pathologist.     Squamous cell carcinoma of skin of left lower limb, including hip  Left Distal Pretibial Region  Mohs surgery  Consent obtained: written    Universal Protocol:  Procedure explained and questions answered to patient or proxy's satisfaction: Yes    Test results available and properly labeled: Yes    Pathology report reviewed: Yes    External notes reviewed: Yes    Photo or diagram used for site identification: Yes    Site/side marked: Yes    Slide independently reviewed by Mohs surgeon: Yes    Immediately prior to procedure a time out was called: Yes    Patient identity confirmed: verbally with patient  Preparation: Patient was prepped and draped in usual sterile fashion      Anticoagulation:  Is the patient taking prescription anticoagulant and/or aspirin prescribed/recommended by a physician? No    Was the anticoagulation regimen changed prior to Mohs? No      Anesthesia:  Anesthesia method:  "local infiltration  Local anesthetic: lidocaine 2% WITH epi    Procedure Details:  Biopsy accession number: [id number] \"outside path\"  Date of biopsy: 7/12/2024  Pre-Op diagnosis: squamous cell carcinoma  SCC subtype: in situ  Surgery side: left  Surgical site (from skin exam): Left Distal Pretibial Region  Pre-operative length (cm): 1.5  Pre-operative width (cm): 1.5  Indications for Mohs surgery: anatomic location where tissue conservation is critical and ill-defined borders  Previously treated? No      Micrographic Surgery Details:  Post-operative length (cm): 1.8  Post-operative width (cm): 1.7  Number of Mohs stages: 1    Stage 1     Comments: The patient was brought into the operating room and placed in the procedure chair in the appropriate position.  The area positive by previous biopsy was identified and confirmed with the patient. The area of clinically obvious tumor was debulked using a curette and/or scalpel as needed. An incision was made following the Mohs approach through the skin. The specimen was taken to the lab, divided into 2 piece(s) and appropriately chromacoded and processed.  Tumor features identified on Mohs section: no tumor identified  Depth of defect: subcutaneous fat    Patient tolerance of procedure: tolerated well, no immediate complications    Reconstruction:  Was the defect reconstructed?: No    Hemostasis achieved with: electrodesiccation  Outcome: patient tolerated procedure well with no complications    Post-procedure details: sterile dressing applied and wound care instructions given    Dressing type: Gelfoam, Telfa pad, pressure dressing and Hypafix      Repair: After a discussion with the patient regarding the options for wound closure, a decision was made to proceed with second intention healing.  Dressing F/U: Surgifoam was placed in the wound. A pressure dressing was placed to help stabilize the wound and to minimize the risk of postoperative bleeding. Wound care was " discussed, and the patient was given written post-operative wound care instructions.     The final repair measured 1.8 x 1.7 cm            Wound care was discussed, and the patient was given written post-operative wound care instructions.      The patient will follow up with Jennie Peraza MD as needed for any post operative problems or concerns, and will follow up with their primary dermatologist as scheduled.

## 2024-09-04 ENCOUNTER — OFFICE VISIT (OUTPATIENT)
Dept: DERMATOLOGY | Facility: CLINIC | Age: 49
End: 2024-09-04
Payer: COMMERCIAL

## 2024-09-04 DIAGNOSIS — Z51.89 VISIT FOR WOUND CHECK: ICD-10-CM

## 2024-09-04 DIAGNOSIS — L92.9 GRANULATION TISSUE OF SKIN: Primary | ICD-10-CM

## 2024-09-04 PROCEDURE — 87077 CULTURE AEROBIC IDENTIFY: CPT | Performed by: DERMATOLOGY

## 2024-09-04 PROCEDURE — 99213 OFFICE O/P EST LOW 20 MIN: CPT | Performed by: DERMATOLOGY

## 2024-09-04 RX ORDER — MUPIROCIN 20 MG/G
OINTMENT TOPICAL 2 TIMES DAILY
Qty: 30 G | Refills: 2 | Status: SHIPPED | OUTPATIENT
Start: 2024-09-04 | End: 2024-09-18

## 2024-09-04 NOTE — PROGRESS NOTES
Office Follow Up Note    Visit Summary  Chief Complaint    1. Complaint Wound check.    Sruthi Murray is a 49 y.o. female who presents for 6 week follow up after surgery for a squamous cell carcinoma. The patient had noticed increased drainage in the past week. She does note that she was on her feet more for work.    Location Operation site location: Left Distal Pretibial Region    On exam,  Ms. Murray is well-appearing and in no apparent distress. The surgical site appears clean with well granulating skin along the edges. Along the center is some drainage.    Assessment and Plan:  Bacterial culture taken today. Start mupirocin BID to area.  History of skin cancer requiring ongoing monitoring for recurrence and additional lesion development.     The dressing was removed, the wound cleaned a a new dressing reapplied.     The patient was advised on the importance of routine skin monitoring including follow up with general dermatology and instructed to call with any further concerns. The patient will return as needed

## 2024-09-06 RX ORDER — GENTAMICIN SULFATE 1 MG/G
OINTMENT TOPICAL 3 TIMES DAILY
Qty: 30 G | Refills: 2 | Status: SHIPPED | OUTPATIENT
Start: 2024-09-06 | End: 2024-09-16

## 2024-09-06 NOTE — RESULT ENCOUNTER NOTE
Please let patient we are still awaiting the final results of her wound culture.    Preliminary results show gram negative bacilli. I'm going to switch her topical antibiotic from mupirocin to topical gentamicin. I will send this off to her pharmacy.

## 2024-09-08 LAB
B-LACTAMASE ORGANISM ISLT: POSITIVE
BACTERIA SPEC CULT: ABNORMAL
GRAM STN SPEC: ABNORMAL
GRAM STN SPEC: ABNORMAL

## 2024-09-09 NOTE — RESULT ENCOUNTER NOTE
*results showed serratia and pseudomonas.    Jose Alberto Negro,    Your final results show pseudomonas and serratia. The topical gentamicin I sent last week to the pharmacy will cover both.    You can also do vinegar soaks to the area:    Vinegar soaks  Vinegar soaks are used to treat inflamed, red conditions of the skin, including skin around the legs. Vinegar contains acetic acid which kills fungi and bacteria such as pseudomonas.  Here is how to use the soaks:  -mix one part plain white vinegar with 3 or 4 parts water in a small bowl  -use a small gauze or tissue and soak it with the vinegar solution  -you want the gauze sopping wet (not dripping or damp but somewhere in between)  -apply the cloth over the affected area and leave it there for 10 or 15 minutes  -then let the area air dry (or use a dryer on the cool setting to dry the area faster)  -repeat this 3 or 4 times a day  -apply the topical gentamicin ointment after using the vinegar washes    This has a wonderful anti-inflammatory action and will dramatically reduce redness and weeping. The idea is to dry out the area but not over-dry the skin - so don't overdo it. You will usually dry out the skin and reduce the redness within 3 days. If you experience burning or irritation - then you can just use tap water soaks without the vinegar and get good results as well.

## 2024-11-06 ENCOUNTER — PROCEDURE VISIT (OUTPATIENT)
Dept: OBSTETRICS AND GYNECOLOGY | Facility: CLINIC | Age: 49
End: 2024-11-06
Payer: COMMERCIAL

## 2024-11-06 VITALS
WEIGHT: 208.4 LBS | DIASTOLIC BLOOD PRESSURE: 85 MMHG | SYSTOLIC BLOOD PRESSURE: 128 MMHG | HEIGHT: 66 IN | BODY MASS INDEX: 33.49 KG/M2

## 2024-11-06 DIAGNOSIS — R87.810 CERVICAL HIGH RISK HPV (HUMAN PAPILLOMAVIRUS) TEST POSITIVE: Primary | ICD-10-CM

## 2024-11-06 PROCEDURE — 57456 ENDOCERV CURETTAGE W/SCOPE: CPT | Performed by: OBSTETRICS & GYNECOLOGY

## 2024-11-06 ASSESSMENT — LIFESTYLE VARIABLES
HOW OFTEN DO YOU HAVE SIX OR MORE DRINKS ON ONE OCCASION: NEVER
HOW OFTEN DO YOU HAVE A DRINK CONTAINING ALCOHOL: NEVER
SKIP TO QUESTIONS 9-10: 1
AUDIT-C TOTAL SCORE: 0
HOW MANY STANDARD DRINKS CONTAINING ALCOHOL DO YOU HAVE ON A TYPICAL DAY: PATIENT DOES NOT DRINK

## 2024-11-06 ASSESSMENT — PAIN SCALES - GENERAL: PAINLEVEL_OUTOF10: 0-NO PAIN

## 2024-11-06 ASSESSMENT — ENCOUNTER SYMPTOMS
LOSS OF SENSATION IN FEET: 0
DEPRESSION: 0
OCCASIONAL FEELINGS OF UNSTEADINESS: 0

## 2024-11-06 ASSESSMENT — PATIENT HEALTH QUESTIONNAIRE - PHQ9
1. LITTLE INTEREST OR PLEASURE IN DOING THINGS: NOT AT ALL
2. FEELING DOWN, DEPRESSED OR HOPELESS: NOT AT ALL
SUM OF ALL RESPONSES TO PHQ9 QUESTIONS 1 & 2: 0

## 2024-11-06 NOTE — PROGRESS NOTES
Colposcopy    Date/Time: 11/6/2024 9:00 AM    Performed by: Ming Tamayo MD  Authorized by: Ming Tamayo MD    Procedure location: cervix    Consent:     Patient questions answered: yes      Risks and benefits of the procedure and its alternatives discussed: yes      Procedural risks discussed:  Bleeding and infection    Consent obtained:  Written    Consent given by:  Patient  Indication:     Cervical indication(s): normal Pap smear, HPV 16 and high-risk HPV positive    Pre-procedure:     Speculum was placed in the vagina: yes      Prep solution(s): acetic acid and Betadine    Procedure:     Colposcopy with: colposcopy only and endocervical curettage      Biopsy taken: no      Colposcopy details:  Cervical stenosis noted, attempted to place tenaculum to get ecc, scant specimen noted    Cervix visibility: fully visualized      SCJ visibility: not fully visualized      Acetowhite lesion(s) comment:  Pinpoint    Vascular changes comment:  Pinpoint    Cervical impression: low grade      Ferric subsulfate solution applied: yes    Post-procedure:     Patient tolerance of procedure:  Patient tolerated the procedure well with no immediate complications

## 2024-11-13 LAB
LABORATORY COMMENT REPORT: NORMAL
PATH REPORT.FINAL DX SPEC: NORMAL
PATH REPORT.GROSS SPEC: NORMAL
PATH REPORT.RELEVANT HX SPEC: NORMAL
PATH REPORT.TOTAL CANCER: NORMAL

## 2024-11-21 LAB
CYTOLOGY CMNT CVX/VAG CYTO-IMP: NORMAL
HPV HR 12 DNA GENITAL QL NAA+PROBE: POSITIVE
HPV HR GENOTYPES PNL CVX NAA+PROBE: POSITIVE
HPV16 DNA SPEC QL NAA+PROBE: POSITIVE
HPV18 DNA SPEC QL NAA+PROBE: NEGATIVE
LAB AP HPV GENOTYPE QUESTION: YES
LAB AP HPV HR: NORMAL
LABORATORY COMMENT REPORT: NORMAL
LABORATORY COMMENT REPORT: NORMAL
PATH REPORT.TOTAL CANCER: NORMAL

## 2024-12-02 ENCOUNTER — TELEPHONE (OUTPATIENT)
Dept: OTOLARYNGOLOGY | Facility: CLINIC | Age: 49
End: 2024-12-02
Payer: COMMERCIAL

## 2024-12-02 DIAGNOSIS — B99.9 INFECTION: Primary | ICD-10-CM

## 2024-12-02 RX ORDER — AMOXICILLIN AND CLAVULANATE POTASSIUM 875; 125 MG/1; MG/1
1 TABLET, FILM COATED ORAL 2 TIMES DAILY
Qty: 20 TABLET | Refills: 0 | Status: SHIPPED | OUTPATIENT
Start: 2024-12-02 | End: 2024-12-12

## 2024-12-02 NOTE — TELEPHONE ENCOUNTER
LOV 4/2024 for recurrent sinus and allergic rhinitis. Last treated in June for sinus infection with augmentin. Called with 10 day congestion, PND with nausea, blowing nose and cough for thick yellow-green mucous. Please advise. ALL: guaifenesin.

## 2025-01-06 ENCOUNTER — TELEPHONE (OUTPATIENT)
Dept: PRIMARY CARE | Facility: CLINIC | Age: 50
End: 2025-01-06
Payer: COMMERCIAL

## 2025-01-06 NOTE — TELEPHONE ENCOUNTER
Patient stated her BP has been elevated 172-110,164/113 and 150/98    Please advise if Patient needs an adjustment to medication Valsartan-Hydrochlorothiazide or an appointment    Patient can be reached at [phone]

## 2025-01-07 ENCOUNTER — TELEPHONE (OUTPATIENT)
Dept: PRIMARY CARE | Facility: CLINIC | Age: 50
End: 2025-01-07
Payer: COMMERCIAL

## 2025-01-07 DIAGNOSIS — I10 PRIMARY HYPERTENSION: ICD-10-CM

## 2025-01-07 NOTE — PROGRESS NOTES
Subjective   Patient ID: Sruthi Murray is a 49 y.o. female who presents for Hypertension.    HPI   Sruthi is here for a blood pressure check.  She has had very well-controlled blood pressure on her current medication.  Called recently stating that her blood pressure has been elevated and wanted to know what to do so she was asked to come in for an appointment.  She was last here in June for a CPE.  She has gained 13 pounds in the past 6 months.  She feels this is related to menopause.  She has an IUD in place and is not having periods.  She denies hot flashes.   She injured her back a couple weeks ago with coughing.  She initially had significant pain but now only notices discomfort when she goes from a sitting to a standing position.  Pain did not radiate down her legs.  She is currently taking nothing for discomfort.  However she has not been on her treadmill because she is afraid of injuring her back.    Review of Systems  She denies headaches or dizziness.  No chest pain, palpitations or pedal edema.  No shortness of breath or dyspnea on exertion.  No cough.    Objective   BP (!) 144/92 (BP Location: Left arm)   Pulse 101   Wt 95.7 kg (211 lb)   LMP  (LMP Unknown)   SpO2 97%   BMI 34.06 kg/m²     Physical Exam  She is alert and comfortable.  Neck is without JVD.  Lungs are clear to auscultation.  Heart is regular in rhythm and rate.  Normal S1 and S2 without murmurs or gallops.  Extremities are without edema.  Back is without bony deformity.  No CVA tenderness.  No tenderness over the spinous processes.  Straight leg raises bilaterally negative.  No muscle spasm appreciated.  Skin turgor is normal and mucous membranes are moist.    Assessment/Plan   Assessment & Plan  Primary hypertension    Orders:  •  Comprehensive metabolic panel; Future  •  TSH with reflex to Free T4 if abnormal; Future  •  ECG 12 lead (Clinic Performed)  •  valsartan-hydrochlorothiazide (Diovan-HCT) 160-25 mg tablet; Take 1  tablet by mouth once daily.  I recommend she get back on the treadmill to start exercising again.  Would like to see her lose the weight that she is gained in the past 6 months.  Her Diovan  dose is increased  - she will still be taking 1 daily.  She should follow-up with me in 3 months for recheck.  Dorsalgia of lumbar region  She should use heat to the area and 20-minute increments followed by stretching.  She was provided a handout on back care and appropriate stretches.

## 2025-01-08 ENCOUNTER — PROCEDURE VISIT (OUTPATIENT)
Dept: OBSTETRICS AND GYNECOLOGY | Facility: CLINIC | Age: 50
End: 2025-01-08
Payer: COMMERCIAL

## 2025-01-08 VITALS
HEART RATE: 98 BPM | OXYGEN SATURATION: 97 % | BODY MASS INDEX: 34.01 KG/M2 | HEIGHT: 66 IN | DIASTOLIC BLOOD PRESSURE: 94 MMHG | SYSTOLIC BLOOD PRESSURE: 149 MMHG | WEIGHT: 211.6 LBS

## 2025-01-08 DIAGNOSIS — R87.810 CERVICAL HIGH RISK HPV (HUMAN PAPILLOMAVIRUS) TEST POSITIVE: Primary | ICD-10-CM

## 2025-01-08 PROCEDURE — 88305 TISSUE EXAM BY PATHOLOGIST: CPT | Mod: TC,WESLAB | Performed by: OBSTETRICS & GYNECOLOGY

## 2025-01-08 PROCEDURE — 57505 ENDOCERVICAL CURETTAGE: CPT | Performed by: OBSTETRICS & GYNECOLOGY

## 2025-01-08 ASSESSMENT — LIFESTYLE VARIABLES
HOW MANY STANDARD DRINKS CONTAINING ALCOHOL DO YOU HAVE ON A TYPICAL DAY: 1 OR 2
AUDIT-C TOTAL SCORE: 2
HOW OFTEN DO YOU HAVE A DRINK CONTAINING ALCOHOL: MONTHLY OR LESS
HOW OFTEN DO YOU HAVE SIX OR MORE DRINKS ON ONE OCCASION: LESS THAN MONTHLY
SKIP TO QUESTIONS 9-10: 0

## 2025-01-08 ASSESSMENT — PATIENT HEALTH QUESTIONNAIRE - PHQ9
SUM OF ALL RESPONSES TO PHQ9 QUESTIONS 1 & 2: 0
2. FEELING DOWN, DEPRESSED OR HOPELESS: NOT AT ALL
1. LITTLE INTEREST OR PLEASURE IN DOING THINGS: NOT AT ALL

## 2025-01-08 ASSESSMENT — ENCOUNTER SYMPTOMS
DEPRESSION: 0
LOSS OF SENSATION IN FEET: 0
OCCASIONAL FEELINGS OF UNSTEADINESS: 0

## 2025-01-08 ASSESSMENT — PAIN SCALES - GENERAL: PAINLEVEL_OUTOF10: 0-NO PAIN

## 2025-01-09 ENCOUNTER — OFFICE VISIT (OUTPATIENT)
Dept: PRIMARY CARE | Facility: CLINIC | Age: 50
End: 2025-01-09
Payer: COMMERCIAL

## 2025-01-09 VITALS
OXYGEN SATURATION: 97 % | HEART RATE: 101 BPM | DIASTOLIC BLOOD PRESSURE: 92 MMHG | WEIGHT: 211 LBS | SYSTOLIC BLOOD PRESSURE: 144 MMHG | BODY MASS INDEX: 34.06 KG/M2

## 2025-01-09 DIAGNOSIS — M54.50 DORSALGIA OF LUMBAR REGION: ICD-10-CM

## 2025-01-09 DIAGNOSIS — I10 PRIMARY HYPERTENSION: Primary | ICD-10-CM

## 2025-01-09 PROCEDURE — 3077F SYST BP >= 140 MM HG: CPT | Performed by: FAMILY MEDICINE

## 2025-01-09 PROCEDURE — 3080F DIAST BP >= 90 MM HG: CPT | Performed by: FAMILY MEDICINE

## 2025-01-09 PROCEDURE — 93000 ELECTROCARDIOGRAM COMPLETE: CPT | Performed by: FAMILY MEDICINE

## 2025-01-09 PROCEDURE — 99214 OFFICE O/P EST MOD 30 MIN: CPT | Performed by: FAMILY MEDICINE

## 2025-01-09 PROCEDURE — 1036F TOBACCO NON-USER: CPT | Performed by: FAMILY MEDICINE

## 2025-01-09 RX ORDER — VALSARTAN AND HYDROCHLOROTHIAZIDE 160; 25 MG/1; MG/1
1 TABLET ORAL DAILY
Qty: 90 TABLET | Refills: 3 | Status: SHIPPED | OUTPATIENT
Start: 2025-01-09 | End: 2026-01-09

## 2025-01-09 ASSESSMENT — PATIENT HEALTH QUESTIONNAIRE - PHQ9
SUM OF ALL RESPONSES TO PHQ9 QUESTIONS 1 AND 2: 0
2. FEELING DOWN, DEPRESSED OR HOPELESS: NOT AT ALL
1. LITTLE INTEREST OR PLEASURE IN DOING THINGS: NOT AT ALL

## 2025-01-09 ASSESSMENT — PAIN SCALES - GENERAL: PAINLEVEL_OUTOF10: 0-NO PAIN

## 2025-01-09 NOTE — ASSESSMENT & PLAN NOTE
Orders:    Comprehensive metabolic panel; Future    TSH with reflex to Free T4 if abnormal; Future    ECG 12 lead (Clinic Performed)    valsartan-hydrochlorothiazide (Diovan-HCT) 160-25 mg tablet; Take 1 tablet by mouth once daily.  I recommend she get back on the treadmill to start exercising again.  Would like to see her lose the weight that she is gained in the past 6 months.  Her Diovan  dose is increased  - she will still be taking 1 daily.  She should follow-up with me in 3 months for recheck.

## 2025-01-09 NOTE — PATIENT INSTRUCTIONS
I have increased the dose of your current blood pressure medication.  You will be still taking 1 tablet daily.  I encourage no added salt to your diet.  Please use heat to your back for 20 minutes followed by stretching according to the handout I provided to you.  I encourage you to get back on your treadmill to work on weight loss.  I also encouraged light weight lifting such as 3 to 5 pounds a couple times a week.

## 2025-01-13 NOTE — PROGRESS NOTES
Colposcopy    Date/Time: 1/12/2025 10:25 PM    Performed by: Ming Tamayo MD  Authorized by: Ming Tamayo MD    Procedure location: cervix    Consent:     Patient questions answered: yes      Risks and benefits of the procedure and its alternatives discussed: yes      Procedural risks discussed:  Bleeding and infection    Consent obtained:  Written    Consent given by:  Patient  Indication:     Cervical indication(s): normal Pap smear, HPV 16 and high-risk HPV positive    Pre-procedure:     Anesthesia premedication: cytotec.    Speculum was placed in the vagina: yes      Prep solution(s): Betadine    Procedure:     Colposcopy with: endocervical curettage      Colposcopy details:  ECC done only due prev unsatisfactory results due to cervical stenosis, cervix dilated w/dilator then ECC collected suscessfully    Cervical impression: normal/benign      Monsel's solution applied: silver nitrate.    Post-procedure:     Patient tolerance of procedure:  Patient tolerated the procedure well with no immediate complications

## 2025-02-04 LAB
ALBUMIN SERPL-MCNC: 4.7 G/DL (ref 3.6–5.1)
ALP SERPL-CCNC: 76 U/L (ref 37–153)
ALT SERPL-CCNC: 18 U/L (ref 6–29)
ANION GAP SERPL CALCULATED.4IONS-SCNC: 11 MMOL/L (CALC) (ref 7–17)
AST SERPL-CCNC: 20 U/L (ref 10–35)
BILIRUB SERPL-MCNC: 0.5 MG/DL (ref 0.2–1.2)
BUN SERPL-MCNC: 18 MG/DL (ref 7–25)
CALCIUM SERPL-MCNC: 9.4 MG/DL (ref 8.6–10.4)
CHLORIDE SERPL-SCNC: 102 MMOL/L (ref 98–110)
CO2 SERPL-SCNC: 25 MMOL/L (ref 20–32)
CREAT SERPL-MCNC: 1.01 MG/DL (ref 0.5–1.03)
EGFRCR SERPLBLD CKD-EPI 2021: 68 ML/MIN/1.73M2
GLUCOSE SERPL-MCNC: 90 MG/DL (ref 65–99)
POTASSIUM SERPL-SCNC: 4 MMOL/L (ref 3.5–5.3)
PROT SERPL-MCNC: 7.2 G/DL (ref 6.1–8.1)
SODIUM SERPL-SCNC: 138 MMOL/L (ref 135–146)
TSH SERPL-ACNC: 1.45 MIU/L

## 2025-02-04 NOTE — RESULT ENCOUNTER NOTE
I am pleased to report that your blood work including kidney tests, liver tests and thyroid are healthy.  Please continue the higher dose of your blood pressure medication and follow-up with me in 3 months as we discussed in the office.

## 2025-02-15 ENCOUNTER — HOSPITAL ENCOUNTER (OUTPATIENT)
Dept: RADIOLOGY | Facility: HOSPITAL | Age: 50
Discharge: HOME | End: 2025-02-15
Payer: COMMERCIAL

## 2025-02-15 DIAGNOSIS — Z12.31 SCREENING MAMMOGRAM FOR BREAST CANCER: ICD-10-CM

## 2025-02-15 PROCEDURE — 77063 BREAST TOMOSYNTHESIS BI: CPT | Performed by: STUDENT IN AN ORGANIZED HEALTH CARE EDUCATION/TRAINING PROGRAM

## 2025-02-15 PROCEDURE — 77067 SCR MAMMO BI INCL CAD: CPT

## 2025-02-15 PROCEDURE — 77067 SCR MAMMO BI INCL CAD: CPT | Performed by: STUDENT IN AN ORGANIZED HEALTH CARE EDUCATION/TRAINING PROGRAM

## 2025-02-17 NOTE — RESULT ENCOUNTER NOTE
Trevor give number for high risk breast   consultation, please call [phone], if pt desires further assessment/eval for breast ca risk

## 2025-04-08 NOTE — PROGRESS NOTES
Subjective   Patient ID: Sruthi Murray is a 50 y.o. female who presents for Blood Pressure Check.    HPI   Sruthi is here for a blood pressure check.  Her Diovan was increased  to 160/25 three months ago.  Her weight had gone up and she had stopped exercising regularly.  Her weight is down 5 pounds.  She has not really made dietary changes but has started to add in exercise.  She feels well.  Going to [place] next week.  She is due for a CPE in June.    Review of Systems  She denies headaches or dizziness.  No chest pain, palpitations or pedal edema.  No shortness of breath or dyspnea on exertion.  No cough.    Objective   /86   Pulse 100   Temp 36.7 °C (98 °F)   Wt 93.4 kg (206 lb)   SpO2 96%   BMI 33.25 kg/m²     Physical Exam  She is alert and comfortable.  Neck is without JVD.  Lungs are clear to auscultation.  Heart is regular in rhythm and rate.  Normal S1 and S2 without murmurs or gallops.  Extremities are without edema.  Skin turgor is normal and mucous membranes are moist.    Assessment/Plan   Assessment & Plan  Primary hypertension  She will continue the current dose of blood pressure medication.  I recommend a low-salt diet with further weight loss.  She should follow-up in 6 months for a CPE.       Class 1 obesity due to excess calories without serious comorbidity in adult, unspecified BMI  Diet and activity was discussed.

## 2025-04-10 ENCOUNTER — TELEPHONE (OUTPATIENT)
Dept: PRIMARY CARE | Facility: CLINIC | Age: 50
End: 2025-04-10

## 2025-04-10 ENCOUNTER — APPOINTMENT (OUTPATIENT)
Dept: PRIMARY CARE | Facility: CLINIC | Age: 50
End: 2025-04-10
Payer: COMMERCIAL

## 2025-04-10 VITALS
HEART RATE: 100 BPM | WEIGHT: 206 LBS | SYSTOLIC BLOOD PRESSURE: 130 MMHG | TEMPERATURE: 98 F | DIASTOLIC BLOOD PRESSURE: 86 MMHG | BODY MASS INDEX: 33.25 KG/M2 | OXYGEN SATURATION: 96 %

## 2025-04-10 DIAGNOSIS — I10 PRIMARY HYPERTENSION: Primary | ICD-10-CM

## 2025-04-10 DIAGNOSIS — E66.09 CLASS 1 OBESITY DUE TO EXCESS CALORIES WITHOUT SERIOUS COMORBIDITY IN ADULT, UNSPECIFIED BMI: ICD-10-CM

## 2025-04-10 DIAGNOSIS — Z00.00 WELL ADULT EXAM: ICD-10-CM

## 2025-04-10 DIAGNOSIS — I10 PRIMARY HYPERTENSION: ICD-10-CM

## 2025-04-10 DIAGNOSIS — E66.811 CLASS 1 OBESITY DUE TO EXCESS CALORIES WITHOUT SERIOUS COMORBIDITY IN ADULT, UNSPECIFIED BMI: ICD-10-CM

## 2025-04-10 PROCEDURE — 3075F SYST BP GE 130 - 139MM HG: CPT | Performed by: FAMILY MEDICINE

## 2025-04-10 PROCEDURE — 3079F DIAST BP 80-89 MM HG: CPT | Performed by: FAMILY MEDICINE

## 2025-04-10 PROCEDURE — 99213 OFFICE O/P EST LOW 20 MIN: CPT | Performed by: FAMILY MEDICINE

## 2025-04-10 PROCEDURE — 1036F TOBACCO NON-USER: CPT | Performed by: FAMILY MEDICINE

## 2025-04-10 RX ORDER — MUPIROCIN 20 MG/G
OINTMENT TOPICAL
COMMUNITY
Start: 2025-01-13

## 2025-04-10 RX ORDER — CICLOPIROX OLAMINE 7.7 MG/G
CREAM TOPICAL
COMMUNITY
Start: 2025-02-24

## 2025-04-10 ASSESSMENT — PAIN SCALES - GENERAL: PAINLEVEL_OUTOF10: 0-NO PAIN

## 2025-04-10 NOTE — ASSESSMENT & PLAN NOTE
She will continue the current dose of blood pressure medication.  I recommend a low-salt diet with further weight loss.  She should follow-up in 6 months for a CPE.

## 2025-04-26 DIAGNOSIS — J30.9 ALLERGIC RHINITIS, UNSPECIFIED: ICD-10-CM

## 2025-04-28 RX ORDER — FLUTICASONE PROPIONATE 50 MCG
SPRAY, SUSPENSION (ML) NASAL
Qty: 48 ML | Refills: 1 | Status: SHIPPED | OUTPATIENT
Start: 2025-04-28

## 2025-10-13 ENCOUNTER — APPOINTMENT (OUTPATIENT)
Dept: PRIMARY CARE | Facility: CLINIC | Age: 50
End: 2025-10-13
Payer: COMMERCIAL